# Patient Record
Sex: MALE | Race: BLACK OR AFRICAN AMERICAN | NOT HISPANIC OR LATINO | URBAN - METROPOLITAN AREA
[De-identification: names, ages, dates, MRNs, and addresses within clinical notes are randomized per-mention and may not be internally consistent; named-entity substitution may affect disease eponyms.]

---

## 2020-03-03 ENCOUNTER — APPOINTMENT (RX ONLY)
Dept: URBAN - METROPOLITAN AREA CLINIC 174 | Facility: CLINIC | Age: 20
Setting detail: DERMATOLOGY
End: 2020-03-03

## 2020-03-03 DIAGNOSIS — L70.0 ACNE VULGARIS: ICD-10-CM

## 2020-03-03 PROCEDURE — ? ORDER TESTS

## 2020-03-03 PROCEDURE — ? COUNSELING

## 2020-03-03 PROCEDURE — ? INTRALESIONAL KENALOG

## 2020-03-03 PROCEDURE — 11900 INJECT SKIN LESIONS </W 7: CPT

## 2020-03-03 ASSESSMENT — LOCATION DETAILED DESCRIPTION DERM
LOCATION DETAILED: LEFT CENTRAL MALAR CHEEK
LOCATION DETAILED: RIGHT LATERAL MALAR CHEEK
LOCATION DETAILED: LEFT NARIS

## 2020-03-03 ASSESSMENT — LOCATION SIMPLE DESCRIPTION DERM
LOCATION SIMPLE: LEFT CHEEK
LOCATION SIMPLE: LEFT NOSE
LOCATION SIMPLE: RIGHT CHEEK

## 2020-03-03 ASSESSMENT — LOCATION ZONE DERM
LOCATION ZONE: FACE
LOCATION ZONE: NOSE

## 2020-03-03 NOTE — PROCEDURE: COUNSELING
Patient Specific Counseling (Will Not Stick From Patient To Patient): \\n-continue isotretinoin authorized Doctor in [place]\\n-await culture 1.nares 2. cyst R cheek Patient Specific Counseling (Will Not Stick From Patient To Patient): \\n-continue isotretinoin authorized Doctor in [place]\\n-await culture 1.nares 2. cyst R cheek

## 2020-03-10 ENCOUNTER — RX ONLY (OUTPATIENT)
Age: 20
Setting detail: RX ONLY
End: 2020-03-10

## 2020-03-17 ENCOUNTER — APPOINTMENT (RX ONLY)
Dept: URBAN - METROPOLITAN AREA CLINIC 174 | Facility: CLINIC | Age: 20
Setting detail: DERMATOLOGY
End: 2020-03-17

## 2020-03-17 DIAGNOSIS — L72.8 OTHER FOLLICULAR CYSTS OF THE SKIN AND SUBCUTANEOUS TISSUE: ICD-10-CM

## 2020-03-17 DIAGNOSIS — L70.0 ACNE VULGARIS: ICD-10-CM

## 2020-03-17 PROCEDURE — ? ADDITIONAL NOTES

## 2020-03-17 PROCEDURE — ? COUNSELING

## 2020-03-17 PROCEDURE — ? INTRALESIONAL KENALOG

## 2020-03-17 PROCEDURE — 11900 INJECT SKIN LESIONS </W 7: CPT

## 2020-03-17 ASSESSMENT — LOCATION SIMPLE DESCRIPTION DERM: LOCATION SIMPLE: RIGHT CHEEK

## 2020-03-17 ASSESSMENT — LOCATION DETAILED DESCRIPTION DERM: LOCATION DETAILED: RIGHT CENTRAL MALAR CHEEK

## 2020-03-17 ASSESSMENT — LOCATION ZONE DERM: LOCATION ZONE: FACE

## 2020-03-17 NOTE — PROCEDURE: COUNSELING
Erythromycin Pregnancy And Lactation Text: This medication is Pregnancy Category B and is considered safe during pregnancy. It is also excreted in breast milk.
Bactrim Counseling:  I discussed with the patient the risks of sulfa antibiotics including but not limited to GI upset, allergic reaction, drug rash, diarrhea, dizziness, photosensitivity, and yeast infections.  Rarely, more serious reactions can occur including but not limited to aplastic anemia, agranulocytosis, methemoglobinemia, blood dyscrasias, liver or kidney failure, lung infiltrates or desquamative/blistering drug rashes.
Minocycline Counseling: Patient advised regarding possible photosensitivity and discoloration of the teeth, skin, lips, tongue and gums.  Patient instructed to avoid sunlight, if possible.  When exposed to sunlight, patients should wear protective clothing, sunglasses, and sunscreen.  The patient was instructed to call the office immediately if the following severe adverse effects occur:  hearing changes, easy bruising/bleeding, severe headache, or vision changes.  The patient verbalized understanding of the proper use and possible adverse effects of minocycline.  All of the patient's questions and concerns were addressed.
Benzoyl Peroxide Pregnancy And Lactation Text: This medication is Pregnancy Category C. It is unknown if benzoyl peroxide is excreted in breast milk.
Topical Clindamycin Counseling: Patient counseled that this medication may cause skin irritation or allergic reactions.  In the event of skin irritation, the patient was advised to reduce the amount of the drug applied or use it less frequently.   The patient verbalized understanding of the proper use and possible adverse effects of clindamycin.  All of the patient's questions and concerns were addressed.
Spironolactone Pregnancy And Lactation Text: This medication can cause feminization of the male fetus and should be avoided during pregnancy. The active metabolite is also found in breast milk.
Include Pregnancy/Lactation Warning?: No
Dapsone Pregnancy And Lactation Text: This medication is Pregnancy Category C and is not considered safe during pregnancy or breast feeding.
Isotretinoin Counseling: Patient should get monthly blood tests, not donate blood, not drive at night if vision affected, not share medication, and not undergo elective surgery for 6 months after tx completed. Side effects reviewed, pt to contact office should one occur.
Minocycline Pregnancy And Lactation Text: This medication is Pregnancy Category D and not consider safe during pregnancy. It is also excreted in breast milk.
Bactrim Pregnancy And Lactation Text: This medication is Pregnancy Category D and is known to cause fetal risk.  It is also excreted in breast milk.
Topical Retinoid counseling:  Patient advised to apply a pea-sized amount only at bedtime and wait 30 minutes after washing their face before applying.  If too drying, patient may add a non-comedogenic moisturizer. The patient verbalized understanding of the proper use and possible adverse effects of retinoids.  All of the patient's questions and concerns were addressed.
Topical Clindamycin Pregnancy And Lactation Text: This medication is Pregnancy Category B and is considered safe during pregnancy. It is unknown if it is excreted in breast milk.
Tetracycline Counseling: Patient counseled regarding possible photosensitivity and increased risk for sunburn.  Patient instructed to avoid sunlight, if possible.  When exposed to sunlight, patients should wear protective clothing, sunglasses, and sunscreen.  The patient was instructed to call the office immediately if the following severe adverse effects occur:  hearing changes, easy bruising/bleeding, severe headache, or vision changes.  The patient verbalized understanding of the proper use and possible adverse effects of tetracycline.  All of the patient's questions and concerns were addressed. Patient understands to avoid pregnancy while on therapy due to potential birth defects.
Doxycycline Counseling:  Patient counseled regarding possible photosensitivity and increased risk for sunburn.  Patient instructed to avoid sunlight, if possible.  When exposed to sunlight, patients should wear protective clothing, sunglasses, and sunscreen.  The patient was instructed to call the office immediately if the following severe adverse effects occur:  hearing changes, easy bruising/bleeding, severe headache, or vision changes.  The patient verbalized understanding of the proper use and possible adverse effects of doxycycline.  All of the patient's questions and concerns were addressed.
Isotretinoin Pregnancy And Lactation Text: This medication is Pregnancy Category X and is considered extremely dangerous during pregnancy. It is unknown if it is excreted in breast milk.
Sarecycline Counseling: Patient advised regarding possible photosensitivity and discoloration of the teeth, skin, lips, tongue and gums.  Patient instructed to avoid sunlight, if possible.  When exposed to sunlight, patients should wear protective clothing, sunglasses, and sunscreen.  The patient was instructed to call the office immediately if the following severe adverse effects occur:  hearing changes, easy bruising/bleeding, severe headache, or vision changes.  The patient verbalized understanding of the proper use and possible adverse effects of sarecycline.  All of the patient's questions and concerns were addressed.
Topical Retinoid Pregnancy And Lactation Text: This medication is Pregnancy Category C. It is unknown if this medication is excreted in breast milk.
Detail Level: Detailed
Topical Sulfur Applications Counseling: Topical Sulfur Counseling: Patient counseled that this medication may cause skin irritation or allergic reactions.  In the event of skin irritation, the patient was advised to reduce the amount of the drug applied or use it less frequently.   The patient verbalized understanding of the proper use and possible adverse effects of topical sulfur application.  All of the patient's questions and concerns were addressed.
Birth Control Pills Counseling: Birth Control Pill Counseling: I discussed with the patient the potential side effects of OCPs including but not limited to increased risk of stroke, heart attack, thrombophlebitis, deep venous thrombosis, hepatic adenomas, breast changes, GI upset, headaches, and depression.  The patient verbalized understanding of the proper use and possible adverse effects of OCPs. All of the patient's questions and concerns were addressed.
Doxycycline Pregnancy And Lactation Text: This medication is Pregnancy Category D and not consider safe during pregnancy. It is also excreted in breast milk but is considered safe for shorter treatment courses.
Azithromycin Counseling:  I discussed with the patient the risks of azithromycin including but not limited to GI upset, allergic reaction, drug rash, diarrhea, and yeast infections.
High Dose Vitamin A Counseling: Side effects reviewed, pt to contact office should one occur.
Tazorac Counseling:  Patient advised that medication is irritating and drying.  Patient may need to apply sparingly and wash off after an hour before eventually leaving it on overnight.  The patient verbalized understanding of the proper use and possible adverse effects of tazorac.  All of the patient's questions and concerns were addressed.
Birth Control Pills Pregnancy And Lactation Text: This medication should be avoided if pregnant and for the first 30 days post-partum.
Erythromycin Counseling:  I discussed with the patient the risks of erythromycin including but not limited to GI upset, allergic reaction, drug rash, diarrhea, increase in liver enzymes, and yeast infections.
Topical Sulfur Applications Pregnancy And Lactation Text: This medication is Pregnancy Category C and has an unknown safety profile during pregnancy. It is unknown if this topical medication is excreted in breast milk.
Benzoyl Peroxide Counseling: Patient counseled that medicine may cause skin irritation and bleach clothing.  In the event of skin irritation, the patient was advised to reduce the amount of the drug applied or use it less frequently.   The patient verbalized understanding of the proper use and possible adverse effects of benzoyl peroxide.  All of the patient's questions and concerns were addressed.
Azithromycin Pregnancy And Lactation Text: This medication is considered safe during pregnancy and is also secreted in breast milk.
High Dose Vitamin A Pregnancy And Lactation Text: High dose vitamin A therapy is contraindicated during pregnancy and breast feeding.
Tazorac Pregnancy And Lactation Text: This medication is not safe during pregnancy. It is unknown if this medication is excreted in breast milk.
Spironolactone Counseling: Patient advised regarding risks of diarrhea, abdominal pain, hyperkalemia, birth defects (for female patients), liver toxicity and renal toxicity. The patient may need blood work to monitor liver and kidney function and potassium levels while on therapy. The patient verbalized understanding of the proper use and possible adverse effects of spironolactone.  All of the patient's questions and concerns were addressed.
Dapsone Counseling: I discussed with the patient the risks of dapsone including but not limited to hemolytic anemia, agranulocytosis, rashes, methemoglobinemia, kidney failure, peripheral neuropathy, headaches, GI upset, and liver toxicity.  Patients who start dapsone require monitoring including baseline LFTs and weekly CBCs for the first month, then every month thereafter.  The patient verbalized understanding of the proper use and possible adverse effects of dapsone.  All of the patient's questions and concerns were addressed.

## 2020-04-06 ENCOUNTER — RX ONLY (OUTPATIENT)
Age: 20
Setting detail: RX ONLY
End: 2020-04-06

## 2020-08-24 ENCOUNTER — APPOINTMENT (RX ONLY)
Dept: URBAN - METROPOLITAN AREA CLINIC 174 | Facility: CLINIC | Age: 20
Setting detail: DERMATOLOGY
End: 2020-08-24

## 2020-08-24 VITALS — TEMPERATURE: 97.1 F

## 2020-08-24 DIAGNOSIS — L70.0 ACNE VULGARIS: ICD-10-CM

## 2020-08-24 PROCEDURE — ? COUNSELING

## 2020-08-24 PROCEDURE — ? PRESCRIPTION

## 2020-08-24 PROCEDURE — 99213 OFFICE O/P EST LOW 20 MIN: CPT

## 2020-08-24 PROCEDURE — ? ADDITIONAL NOTES

## 2020-08-24 RX ORDER — DESONIDE 0.5 MG/G
CREAM TOPICAL
Qty: 1 | Refills: 0 | Status: ERX | COMMUNITY
Start: 2020-08-24

## 2020-08-24 RX ORDER — TAZAROTENE 1 MG/G
CREAM TOPICAL
Qty: 1 | Refills: 1 | Status: ERX | COMMUNITY
Start: 2020-08-24

## 2020-08-24 RX ADMIN — TAZAROTENE: 1 CREAM TOPICAL at 00:00

## 2020-08-24 RX ADMIN — DESONIDE: 0.5 CREAM TOPICAL at 00:00

## 2023-10-03 ENCOUNTER — LAB (OUTPATIENT)
Dept: LAB | Facility: LAB | Age: 23
End: 2023-10-03
Payer: COMMERCIAL

## 2023-10-03 DIAGNOSIS — K50.90 CROHN'S DISEASE, UNSPECIFIED, WITHOUT COMPLICATIONS (MULTI): ICD-10-CM

## 2023-10-03 DIAGNOSIS — K50.90 CROHN'S DISEASE, UNSPECIFIED, WITHOUT COMPLICATIONS (MULTI): Primary | ICD-10-CM

## 2023-10-03 PROCEDURE — 86140 C-REACTIVE PROTEIN: CPT

## 2023-10-03 PROCEDURE — 83993 ASSAY FOR CALPROTECTIN FECAL: CPT

## 2023-10-03 PROCEDURE — 85652 RBC SED RATE AUTOMATED: CPT

## 2023-10-03 PROCEDURE — 80053 COMPREHEN METABOLIC PANEL: CPT

## 2023-10-03 PROCEDURE — 36415 COLL VENOUS BLD VENIPUNCTURE: CPT

## 2023-10-03 PROCEDURE — 80145 DRUG ASSAY ADALIMUMAB: CPT | Performed by: NURSE PRACTITIONER

## 2023-10-03 PROCEDURE — 85027 COMPLETE CBC AUTOMATED: CPT

## 2023-10-03 PROCEDURE — 86481 TB AG RESPONSE T-CELL SUSP: CPT

## 2023-10-04 LAB
ALBUMIN SERPL BCP-MCNC: 4.8 G/DL (ref 3.4–5)
ALP SERPL-CCNC: 52 U/L (ref 33–120)
ALT SERPL W P-5'-P-CCNC: 26 U/L (ref 10–52)
ANION GAP SERPL CALC-SCNC: 12 MMOL/L (ref 10–20)
AST SERPL W P-5'-P-CCNC: 22 U/L (ref 9–39)
BILIRUB SERPL-MCNC: 1 MG/DL (ref 0–1.2)
BUN SERPL-MCNC: 17 MG/DL (ref 6–23)
CALCIUM SERPL-MCNC: 10.1 MG/DL (ref 8.6–10.6)
CHLORIDE SERPL-SCNC: 104 MMOL/L (ref 98–107)
CO2 SERPL-SCNC: 28 MMOL/L (ref 21–32)
CREAT SERPL-MCNC: 1.15 MG/DL (ref 0.5–1.3)
CRP SERPL-MCNC: <0.1 MG/DL
ERYTHROCYTE [DISTWIDTH] IN BLOOD BY AUTOMATED COUNT: 12.4 % (ref 11.5–14.5)
ERYTHROCYTE [SEDIMENTATION RATE] IN BLOOD BY WESTERGREN METHOD: 1 MM/H (ref 0–15)
GFR SERPL CREATININE-BSD FRML MDRD: >90 ML/MIN/1.73M*2
GLUCOSE SERPL-MCNC: 83 MG/DL (ref 74–99)
HCT VFR BLD AUTO: 46.4 % (ref 41–52)
HGB BLD-MCNC: 15.7 G/DL (ref 13.5–17.5)
MCH RBC QN AUTO: 31.3 PG (ref 26–34)
MCHC RBC AUTO-ENTMCNC: 33.8 G/DL (ref 32–36)
MCV RBC AUTO: 93 FL (ref 80–100)
NRBC BLD-RTO: 0 /100 WBCS (ref 0–0)
PLATELET # BLD AUTO: 189 X10*3/UL (ref 150–450)
PMV BLD AUTO: 12.3 FL (ref 7.5–11.5)
POTASSIUM SERPL-SCNC: 4.2 MMOL/L (ref 3.5–5.3)
PROT SERPL-MCNC: 7.3 G/DL (ref 6.4–8.2)
RBC # BLD AUTO: 5.01 X10*6/UL (ref 4.5–5.9)
SODIUM SERPL-SCNC: 140 MMOL/L (ref 136–145)
WBC # BLD AUTO: 8.1 X10*3/UL (ref 4.4–11.3)

## 2023-10-06 LAB
NIL(NEG) CONTROL SPOT COUNT: NORMAL
PANEL A SPOT COUNT: 0
PANEL B SPOT COUNT: 1
POS CONTROL SPOT COUNT: NORMAL
T-SPOT. TB INTERPRETATION: NEGATIVE

## 2023-10-07 LAB
ADALIMUMAB NAB TITR SER BIOASSAY: NOT DETECTED {TITER}
ADALIMUMAB SERPL-MCNC: 16.34 UG/ML
CALPROTECTIN STL-MCNT: 122 UG/G
PATHOLOGY STUDY: NORMAL

## 2023-10-10 DIAGNOSIS — K50.80 CROHN'S DISEASE OF BOTH SMALL AND LARGE INTESTINE WITHOUT COMPLICATION (MULTI): Primary | ICD-10-CM

## 2024-02-06 DIAGNOSIS — K50.80 CROHN'S DISEASE OF BOTH SMALL AND LARGE INTESTINE WITHOUT COMPLICATION (MULTI): Primary | ICD-10-CM

## 2024-02-06 RX ORDER — ADALIMUMAB 40MG/0.4ML
1 KIT SUBCUTANEOUS
Qty: 4 EACH | Refills: 6 | Status: SHIPPED | OUTPATIENT
Start: 2024-02-06 | End: 2024-03-05 | Stop reason: SDUPTHER

## 2024-03-05 ENCOUNTER — TELEPHONE (OUTPATIENT)
Dept: GASTROENTEROLOGY | Facility: HOSPITAL | Age: 24
End: 2024-03-05
Payer: COMMERCIAL

## 2024-03-05 DIAGNOSIS — K50.80 CROHN'S DISEASE OF BOTH SMALL AND LARGE INTESTINE WITHOUT COMPLICATION (MULTI): ICD-10-CM

## 2024-03-05 RX ORDER — ADALIMUMAB 40MG/0.4ML
1 KIT SUBCUTANEOUS
Qty: 4 EACH | Refills: 6 | Status: SHIPPED | OUTPATIENT
Start: 2024-03-05 | End: 2024-03-13 | Stop reason: WASHOUT

## 2024-03-07 ENCOUNTER — SPECIALTY PHARMACY (OUTPATIENT)
Dept: PHARMACY | Facility: CLINIC | Age: 24
End: 2024-03-07

## 2024-03-13 DIAGNOSIS — K50.80 CROHN'S DISEASE OF BOTH SMALL AND LARGE INTESTINE WITHOUT COMPLICATION (MULTI): Primary | ICD-10-CM

## 2024-03-13 RX ORDER — ADALIMUMAB-ADAZ 40 MG/.4ML
40 INJECTION, SOLUTION SUBCUTANEOUS
Qty: 2.4 ML | Refills: 3 | Status: SHIPPED | OUTPATIENT
Start: 2024-03-13 | End: 2025-03-13

## 2024-06-25 ENCOUNTER — OFFICE VISIT (OUTPATIENT)
Dept: GASTROENTEROLOGY | Facility: HOSPITAL | Age: 24
End: 2024-06-25
Payer: COMMERCIAL

## 2024-06-25 ENCOUNTER — LAB (OUTPATIENT)
Dept: LAB | Facility: LAB | Age: 24
End: 2024-06-25
Payer: COMMERCIAL

## 2024-06-25 ENCOUNTER — APPOINTMENT (OUTPATIENT)
Dept: GASTROENTEROLOGY | Facility: HOSPITAL | Age: 24
End: 2024-06-25
Payer: COMMERCIAL

## 2024-06-25 VITALS
HEIGHT: 70 IN | TEMPERATURE: 98.3 F | BODY MASS INDEX: 25.62 KG/M2 | SYSTOLIC BLOOD PRESSURE: 102 MMHG | HEART RATE: 93 BPM | WEIGHT: 179 LBS | OXYGEN SATURATION: 97 % | DIASTOLIC BLOOD PRESSURE: 66 MMHG

## 2024-06-25 DIAGNOSIS — K50.80 CROHN'S DISEASE OF BOTH SMALL AND LARGE INTESTINE WITHOUT COMPLICATION (MULTI): ICD-10-CM

## 2024-06-25 DIAGNOSIS — K21.9 GASTROESOPHAGEAL REFLUX DISEASE WITHOUT ESOPHAGITIS: ICD-10-CM

## 2024-06-25 DIAGNOSIS — K50.80 CROHN'S DISEASE OF BOTH SMALL AND LARGE INTESTINE WITHOUT COMPLICATION (MULTI): Primary | ICD-10-CM

## 2024-06-25 LAB
ALBUMIN SERPL BCP-MCNC: 4.4 G/DL (ref 3.4–5)
ALP SERPL-CCNC: 48 U/L (ref 33–120)
ALT SERPL W P-5'-P-CCNC: 38 U/L (ref 10–52)
ANION GAP SERPL CALC-SCNC: 12 MMOL/L (ref 10–20)
AST SERPL W P-5'-P-CCNC: 21 U/L (ref 9–39)
BILIRUB SERPL-MCNC: 0.6 MG/DL (ref 0–1.2)
BUN SERPL-MCNC: 14 MG/DL (ref 6–23)
CALCIUM SERPL-MCNC: 9.5 MG/DL (ref 8.6–10.6)
CHLORIDE SERPL-SCNC: 105 MMOL/L (ref 98–107)
CO2 SERPL-SCNC: 26 MMOL/L (ref 21–32)
CREAT SERPL-MCNC: 1.1 MG/DL (ref 0.5–1.3)
CRP SERPL-MCNC: <0.1 MG/DL
EGFRCR SERPLBLD CKD-EPI 2021: >90 ML/MIN/1.73M*2
ERYTHROCYTE [DISTWIDTH] IN BLOOD BY AUTOMATED COUNT: 12.6 % (ref 11.5–14.5)
ERYTHROCYTE [SEDIMENTATION RATE] IN BLOOD BY WESTERGREN METHOD: 9 MM/H (ref 0–15)
GLUCOSE SERPL-MCNC: 79 MG/DL (ref 74–99)
HCT VFR BLD AUTO: 45.6 % (ref 41–52)
HGB BLD-MCNC: 15.8 G/DL (ref 13.5–17.5)
MCH RBC QN AUTO: 31.2 PG (ref 26–34)
MCHC RBC AUTO-ENTMCNC: 34.6 G/DL (ref 32–36)
MCV RBC AUTO: 90 FL (ref 80–100)
NRBC BLD-RTO: 0 /100 WBCS (ref 0–0)
PLATELET # BLD AUTO: 204 X10*3/UL (ref 150–450)
POTASSIUM SERPL-SCNC: 4.3 MMOL/L (ref 3.5–5.3)
PROT SERPL-MCNC: 6.9 G/DL (ref 6.4–8.2)
RBC # BLD AUTO: 5.06 X10*6/UL (ref 4.5–5.9)
SODIUM SERPL-SCNC: 139 MMOL/L (ref 136–145)
WBC # BLD AUTO: 6.8 X10*3/UL (ref 4.4–11.3)

## 2024-06-25 PROCEDURE — 86481 TB AG RESPONSE T-CELL SUSP: CPT

## 2024-06-25 PROCEDURE — 36415 COLL VENOUS BLD VENIPUNCTURE: CPT

## 2024-06-25 PROCEDURE — 85027 COMPLETE CBC AUTOMATED: CPT

## 2024-06-25 PROCEDURE — 83993 ASSAY FOR CALPROTECTIN FECAL: CPT

## 2024-06-25 PROCEDURE — 99214 OFFICE O/P EST MOD 30 MIN: CPT | Performed by: NURSE PRACTITIONER

## 2024-06-25 PROCEDURE — 86140 C-REACTIVE PROTEIN: CPT

## 2024-06-25 PROCEDURE — 85652 RBC SED RATE AUTOMATED: CPT

## 2024-06-25 PROCEDURE — 1036F TOBACCO NON-USER: CPT | Performed by: NURSE PRACTITIONER

## 2024-06-25 PROCEDURE — 80053 COMPREHEN METABOLIC PANEL: CPT

## 2024-06-25 RX ORDER — FAMOTIDINE 20 MG/1
1 TABLET, FILM COATED ORAL 2 TIMES DAILY
COMMUNITY
Start: 2022-09-07 | End: 2024-06-25 | Stop reason: SDUPTHER

## 2024-06-25 RX ORDER — FAMOTIDINE 20 MG/1
20 TABLET, FILM COATED ORAL 2 TIMES DAILY
Qty: 180 TABLET | Refills: 3 | Status: SHIPPED | OUTPATIENT
Start: 2024-06-25 | End: 2025-06-25

## 2024-06-25 RX ORDER — PROPRANOLOL/HYDROCHLOROTHIAZID 40 MG-25MG
TABLET ORAL
COMMUNITY

## 2024-06-25 RX ORDER — FLUTICASONE PROPIONATE 50 MCG
1 SPRAY, SUSPENSION (ML) NASAL
COMMUNITY
Start: 2024-05-28

## 2024-06-25 RX ORDER — ASPIRIN 325 MG
TABLET ORAL
COMMUNITY

## 2024-06-25 ASSESSMENT — PAIN SCALES - GENERAL: PAINLEVEL: 0-NO PAIN

## 2024-06-25 NOTE — PROGRESS NOTES
Subjective   Patient ID: Cam Allen is a 24 y.o. male.    HPI  24-year-old male for follow-up of Crohn's disease  Previously seen 9/22/2023 abs reviewed 10/3/2023  Fecal calprotectin 122  Adalimumab level 16.34 no antibodies detected  Normal CMP  , CRP, sed rate, H&H negative TB spot  On Humira every other week- was changed to Hyrimoz and doing well  Bm good  No blood  Has had some sinus symptoms  Was on antibiotics and steroids for a while  Takes zyrtec for allergies    IBD HX:  He originally presented in 2009 with symptoms and was diagnosed in October 2009 with an upper scope that showed a few small ulcers in the esophagus and stomach and a colonoscopy that showed ulcers in the terminal ileum. Biopsies confirmed chronic changes and a diagnosis of Crohn's was confirmed. He was started on oral steroids, Imuran and Pentasa at that time. He was off initial steroids by 12/2009. In mid 2010 he had some abdominal pain and another course of steroids while continuing on his Imuran but holding his Pentasa. He improved and was weaned off steroids. Pentasa was restarted again in 2011 and he was in clinical remission in 2012 through early 2013. At that time he had a repeat scope with ulcers in his stomach and terminal ileum his Imuran was increased to 100 mg once daily and he was treated with Entocort. His Imuran was subsequently increased to 125 mg daily which was still below therapeutic levels. He then was transitioned to a different provider and continued to have inflammation on his scopes. Entocort was discontinued. He remained in remission from 7 . He went for repeat scopes 6/9/2021 for reassessment of his Crohn's disease which showed normal upper but an area of inflammation in the terminal ileum. He was subsequently started on Humira and discontinued his Imuran and Pentasa.    Objective   Physical Exam  Cardiovascular:      Rate and Rhythm: Normal rate and regular rhythm.      Pulses: Normal pulses.       Heart sounds: Normal heart sounds.   Pulmonary:      Effort: Pulmonary effort is normal.      Breath sounds: Normal breath sounds.   Abdominal:      General: Bowel sounds are normal.      Palpations: Abdomen is soft.         Assessment/Plan     Crohns disease- I would recommend continuing the Adalimumab every other to control your disease. You are also due for blood work stool for fecal calprotectin and a colonoscopy.    I will see you back for follow up in 6 months or sooner if needed

## 2024-06-25 NOTE — PATIENT INSTRUCTIONS
Crohns disease- I would recommend continuing the Adalimumab every other to control your disease. You are also due for blood work stool for fecal calprotectin and a colonoscopy.    I will see you back for follow up in 6 months or sooner if needed

## 2024-06-27 LAB
NIL(NEG) CONTROL SPOT COUNT: NORMAL
PANEL A SPOT COUNT: 0
PANEL B SPOT COUNT: 0
POS CONTROL SPOT COUNT: NORMAL
T-SPOT. TB INTERPRETATION: NEGATIVE

## 2024-06-28 LAB — CALPROTECTIN STL-MCNT: 37 UG/G

## 2024-09-20 ENCOUNTER — APPOINTMENT (OUTPATIENT)
Dept: ORTHOPEDIC SURGERY | Facility: CLINIC | Age: 24
End: 2024-09-20
Payer: COMMERCIAL

## 2024-09-20 ENCOUNTER — HOSPITAL ENCOUNTER (OUTPATIENT)
Dept: RADIOLOGY | Facility: CLINIC | Age: 24
Discharge: HOME | End: 2024-09-20
Payer: COMMERCIAL

## 2024-09-20 ENCOUNTER — OFFICE VISIT (OUTPATIENT)
Dept: ORTHOPEDIC SURGERY | Facility: CLINIC | Age: 24
End: 2024-09-20
Payer: COMMERCIAL

## 2024-09-20 VITALS
WEIGHT: 177.03 LBS | DIASTOLIC BLOOD PRESSURE: 55 MMHG | HEART RATE: 60 BPM | BODY MASS INDEX: 25.34 KG/M2 | SYSTOLIC BLOOD PRESSURE: 113 MMHG | HEIGHT: 70 IN

## 2024-09-20 DIAGNOSIS — M25.562 ACUTE PAIN OF LEFT KNEE: ICD-10-CM

## 2024-09-20 DIAGNOSIS — M25.562 ACUTE PAIN OF LEFT KNEE: Primary | ICD-10-CM

## 2024-09-20 PROCEDURE — 99214 OFFICE O/P EST MOD 30 MIN: CPT | Performed by: PEDIATRICS

## 2024-09-20 PROCEDURE — 1036F TOBACCO NON-USER: CPT | Performed by: PEDIATRICS

## 2024-09-20 PROCEDURE — 97530 THERAPEUTIC ACTIVITIES: CPT | Performed by: PEDIATRICS

## 2024-09-20 PROCEDURE — 99204 OFFICE O/P NEW MOD 45 MIN: CPT | Performed by: PEDIATRICS

## 2024-09-20 PROCEDURE — 3008F BODY MASS INDEX DOCD: CPT | Performed by: PEDIATRICS

## 2024-09-20 PROCEDURE — 73564 X-RAY EXAM KNEE 4 OR MORE: CPT | Mod: LT

## 2024-09-20 ASSESSMENT — PAIN SCALES - GENERAL: PAINLEVEL: 0-NO PAIN

## 2024-09-20 NOTE — LETTER
September 23, 2024     Mahi Newsome MD  [address]  [facility] [address]    Patient: Cam Allen   YOB: 2000   Date of Visit: 9/20/2024       Dear Dr. Mahi Newsome MD:    Thank you for referring Cam Allen to me for evaluation. Below are my notes for this consultation.  If you have questions, please do not hesitate to call me. I look forward to following your patient along with you.       Sincerely,     Stephanie COSBY Gerard,       CC: No Recipients  ______________________________________________________________________________________    Access Code: [email or web address]  URL: [email or web address]  Date: 09/20/2024  Prepared by: Edward Spangler AT, PTA    Exercises  - Standing Hamstring Stretch with Step  - 2-3 x daily - 7 x weekly - 3 sets - 30 hold  - Half Kneeling Hip Flexor Stretch  - 2-3 x daily - 7 x weekly - 3 sets - 30 hold  - Sidelying Hip Abduction  - 1 x daily - 3-5 x weekly - 1-3 sets - 10 reps - 5 hold  - Forward Monster Walks  - 1 x daily - 3-5 x weekly - 3 sets - 10 reps  - Backward Monster Walks  - 1 x daily - 3-5 x weekly - 3 sets - 10 reps  - Side Stepping with Resistance at Thighs  - 1 x daily - 3-5 x weekly - 3 sets - 10 reps  - Single Leg Stance  - 1 x daily - 7 x weekly - 3 sets - 30 hold  - Single Leg Balance with Clock Reach  - 1 x daily - 7 x weekly - 3 sets - 10-20 reps  - Diagonal Forward Reaches  - 1 x daily - 7 x weekly - 3 sets - 10-20 reps    Chief Complaint   Patient presents with   • Left Knee - Follow-up       Consulting physician: Mahi Newsome MD    A report with my findings and recommendations will be sent to the primary and referring physician via written or electronic means when information is available    History of Present Illness:  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 He did not have pain while playing, but afterwards had progressively worsening pain. When he woke up in the morning he was having significant pain on the medial aspect of his knee particular with flexion and extension and ambulation.  Going up and down stairs is particularly painful as well.  He did not ice or try any medicine, but notes yesterday stopped limping and overall feels improved.  Denies any significant swelling, bruising.  Denies any ankle or hip pain.  Denies any numbness tingling or weakness.    Past [facility] HX:  Specialty Problems    None       ROS  12 point ROS reviewed and is negative except for items listed     Medications:   Current Outpatient Medications on File Prior to Visit   Medication Sig Dispense Refill   • adalimumab-adaz (Hyrimoz,CF, Pen) 40 mg/0.4 mL pen injector Inject 40 mg under the skin every 14 (fourteen) days. 2.4 mL 3   • famotidine (Pepcid) 20 mg tablet Take 1 tablet (20 mg) by mouth 2 times a day. 180 tablet 3   • fluticasone (Flonase) 50 mcg/actuation nasal spray 1 spray by Does not apply route once daily.     • Lactobacillus rhamnosus GG (CULTURELLE) 15 billion cell capsule, sprinkle capsule Take 1 capsule by mouth once daily.     • multivitamin (One Daily Multivitamin) tablet Take by mouth.     • turmeric-turmeric root extract 450-50 mg capsule Take by mouth.       No current facility-administered medications on file prior to visit.         Allergies:    Allergies   Allergen Reactions   • Codeine Hives        Physical Exam:    Visit Vitals  /55   Pulse 60   Ht 1.77 m (5' 9.69\")   Wt 80.3 kg (177 lb 0.5 oz)   BMI 25.63 kg/m²   Smoking Status Never   BSA 1.99 m²        Vitals reviewed    General appearance: Well-appearing well-nourished  Psych: Normal mood and affect    Neuro: Normal sensation to light touch throughout the involved extremities  Vascular: No extremity edema or discoloration.  Skin: negative.  Lymphatic: no regional lymphadenopathy " present.  Eyes: no conjunctival injection.      BILATERAL  Knee exam:     Inspection:  Effusion: None   Erythema No  Warmth No  Ecchymosis No  Quadriceps atrophy No    Knee ROM:    Flexion (140): Full, pain free  Extension (0): Full, pain free    Hip ROM:   Hip flexion (supine) (140) Full, pain free  Hip extension (prone) (15) Full, pain free  Hip abduction (45) Full, pain free  Hip adduction (30-45)Full, pain free  Hip IR at 90 flexion (40) Full, pain free  Hip ER at 90 Flexion(40-50) Full, pain free    Palpation:    TTP Medial joint line No  TTP Lateral joint line No  TTP MCL No  TTP LCL No    TTP Inferior medial patellar facets L   TTP Superior medial patellar facets L   TTP Inferior lateral patellar facets No  TTP Superior lateral patellar facet No    TTP Medial femoral condyle L  TTP Lateral femoral condyle No  TTP Medial tibial plateau No  TTP Lateral tibial plateau No  TTP Tibial tubercle No  TTP Inferior pole patella No  TTP Fibular head No  TTP Hoffa's fat pad No    TTP Distal hamstring tendon No  TTP Pes anserine bursa No  TTP Quad tendon No  TTP Patellar tendon No  TTP Proximal gastrocnemius tendon No  TTP Distal iliotibial band, Gerdy's tubercle No    TTP Hip joint line No    Patellar testing:   quadrants of glide: normal  Pain w/ patellar compression No  Apprehension Negative, but does have discomfort in the area of his medial L knee similar to his previous pain   Inhibition Negative    Ligament testing:   Lachman Negative   Anterior drawer Negative   Valgus stress testing performed at 0 and 20 Negative  Varus stress testing performed at 0 and 20 Negative   Posterior drawer Negative      Meniscus tests:   Franci's Negative   Apley's Grind Negative     Strength:  Quadriceps pain free, 5/5  Hamstring pain free, 5/5  Hip abduction pain free, 5/5  Hip adduction pain free, 5/5  Hip flexion seated pain free, 5/5  Hip flexion supine pain free, 5/5  Hip extension pain free, 5/5    Flexibility:   Popliteal  angle L 145  Popliteal angle R 150  Heel to butt: 4cm, 4cm     Functional:  Trendelenburg: Negative   Single leg squats: valgus, pain medial left  Hop test: non painful  Squat: no pain    Gait non-antalgic      Imaging:  No acute fracture or dislocation, medial and lateral joint lines have appropriate spacing above them.  No obvious effusion noted.    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  Exam and history consistent with traumatic patellofemoral pain.  Recommending strengthening exercises, stretching of his hip flexors and hamstrings, and anti-inflammatories.  ATC taught exercises in the office today.    A detailed exercise program (< 15 minutes of education time) was demonstrated and taught to the patient by Edward Spangler ATC / GAYLA. The purpose of the program was to restore functional strength, and/or range of motion, and/or flexibility. A handout with the exercises and instructions for online access to video demonstrations was provided.     Follow-up: As needed for his additional right hip and right shoulder pain.    Yasmany Palomo DO  [facility] Fellow     I saw and evaluated the patient. I personally obtained the key and critical portions of the history and physical exam or was physically present for key and critical portions performed by the resident/fellow. I reviewed the resident/fellow's documentation and discussed the patient with the resident/fellow. I agree with the resident/fellow's medical decision making as  documented in the note.    ** Please excuse any errors in grammar or translation related to this dictation. Voice recognition software was utilized to prepare this document. **

## 2024-09-20 NOTE — PROGRESS NOTES
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 He did not have pain while playing, but afterwards had progressively worsening pain. When he woke up in the morning he was having significant pain on the medial aspect of his knee particular with flexion and extension and ambulation.  Going up and down stairs is particularly painful as well.  He did not ice or try any medicine, but notes yesterday stopped limping and overall feels improved.  Denies any significant swelling, bruising.  Denies any ankle or hip pain.  Denies any numbness tingling or weakness.    Past [facility] HX:  Specialty Problems    None       ROS  12 point ROS reviewed and is negative except for items listed     Medications:   Current Outpatient Medications on File Prior to Visit   Medication Sig Dispense Refill    adalimumab-adaz (Hyrimoz,CF, Pen) 40 mg/0.4 mL pen injector Inject 40 mg under the skin every 14 (fourteen) days. 2.4 mL 3    famotidine (Pepcid) 20 mg tablet Take 1 tablet (20 mg) by mouth 2 times a day. 180 tablet 3    fluticasone (Flonase) 50 mcg/actuation nasal spray 1 spray by Does not apply route once daily.      Lactobacillus rhamnosus GG (CULTURELLE) 15 billion cell capsule, sprinkle capsule Take 1 capsule by mouth once daily.      multivitamin (One Daily Multivitamin) tablet Take by mouth.      turmeric-turmeric root extract 450-50 mg capsule Take by mouth.       No current facility-administered medications on file prior to visit.         Allergies:   "  Allergies   Allergen Reactions    Codeine Hives        Physical Exam:    Visit Vitals  /55   Pulse 60   Ht 1.77 m (5' 9.69\")   Wt 80.3 kg (177 lb 0.5 oz)   BMI 25.63 kg/m²   Smoking Status Never   BSA 1.99 m²        Vitals reviewed    General appearance: Well-appearing well-nourished  Psych: Normal mood and affect    Neuro: Normal sensation to light touch throughout the involved extremities  Vascular: No extremity edema or discoloration.  Skin: negative.  Lymphatic: no regional lymphadenopathy present.  Eyes: no conjunctival injection.      BILATERAL  Knee exam:     Inspection:  Effusion: None   Erythema No  Warmth No  Ecchymosis No  Quadriceps atrophy No    Knee ROM:    Flexion (140): Full, pain free  Extension (0): Full, pain free    Hip ROM:   Hip flexion (supine) (140) Full, pain free  Hip extension (prone) (15) Full, pain free  Hip abduction (45) Full, pain free  Hip adduction (30-45)Full, pain free  Hip IR at 90 flexion (40) Full, pain free  Hip ER at 90 Flexion(40-50) Full, pain free    Palpation:    TTP Medial joint line No  TTP Lateral joint line No  TTP MCL No  TTP LCL No    TTP Inferior medial patellar facets L   TTP Superior medial patellar facets L   TTP Inferior lateral patellar facets No  TTP Superior lateral patellar facet No    TTP Medial femoral condyle L  TTP Lateral femoral condyle No  TTP Medial tibial plateau No  TTP Lateral tibial plateau No  TTP Tibial tubercle No  TTP Inferior pole patella No  TTP Fibular head No  TTP Hoffa's fat pad No    TTP Distal hamstring tendon No  TTP Pes anserine bursa No  TTP Quad tendon No  TTP Patellar tendon No  TTP Proximal gastrocnemius tendon No  TTP Distal iliotibial band, Gerdy's tubercle No    TTP Hip joint line No    Patellar testing:   quadrants of glide: normal  Pain w/ patellar compression No  Apprehension Negative, but does have discomfort in the area of his medial L knee similar to his previous pain   Inhibition Negative    Ligament testing: "   Lachman Negative   Anterior drawer Negative   Valgus stress testing performed at 0 and 20 Negative  Varus stress testing performed at 0 and 20 Negative   Posterior drawer Negative      Meniscus tests:   Franci's Negative   Apley's Grind Negative     Strength:  Quadriceps pain free, 5/5  Hamstring pain free, 5/5  Hip abduction pain free, 5/5  Hip adduction pain free, 5/5  Hip flexion seated pain free, 5/5  Hip flexion supine pain free, 5/5  Hip extension pain free, 5/5    Flexibility:   Popliteal angle L 145  Popliteal angle R 150  Heel to butt: 4cm, 4cm     Functional:  Trendelenburg: Negative   Single leg squats: valgus, pain medial left  Hop test: non painful  Squat: no pain    Gait non-antalgic      Imaging:  No acute fracture or dislocation, medial and lateral joint lines have appropriate spacing above them.  No obvious effusion noted.    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  Exam and history consistent with traumatic patellofemoral pain.  Recommending strengthening exercises, stretching of his hip flexors and hamstrings, and anti-inflammatories.  ATC taught exercises in the office today.    A detailed exercise program (< 15 minutes of education time) was demonstrated and taught to the patient by Edward Spangler ATC / GAYLA. The purpose of the program was to restore functional strength, and/or range of motion, and/or flexibility. A handout with the exercises and instructions for online access to video demonstrations was  provided.     Follow-up: As needed for his additional right hip and right shoulder pain.    Yasmany Palomo, DO  [facility] Sports Medicine Fellow     I saw and evaluated the patient. I personally obtained the key and critical portions of the history and physical exam or was physically present for key and critical portions performed by the resident/fellow. I reviewed the resident/fellow's documentation and discussed the patient with the resident/fellow. I agree with the resident/fellow's medical decision making as documented in the note.    ** Please excuse any errors in grammar or translation related to this dictation. Voice recognition software was utilized to prepare this document. **

## 2024-09-20 NOTE — PROGRESS NOTES
Access Code: [email or web address]  URL: [email or web address]  Date: 09/20/2024  Prepared by: Edward Spangler AT, PTA    Exercises  - Standing Hamstring Stretch with Step  - 2-3 x daily - 7 x weekly - 3 sets - 30 hold  - Half Kneeling Hip Flexor Stretch  - 2-3 x daily - 7 x weekly - 3 sets - 30 hold  - Sidelying Hip Abduction  - 1 x daily - 3-5 x weekly - 1-3 sets - 10 reps - 5 hold  - Forward Monster Walks  - 1 x daily - 3-5 x weekly - 3 sets - 10 reps  - Backward Monster Walks  - 1 x daily - 3-5 x weekly - 3 sets - 10 reps  - Side Stepping with Resistance at Thighs  - 1 x daily - 3-5 x weekly - 3 sets - 10 reps  - Single Leg Stance  - 1 x daily - 7 x weekly - 3 sets - 30 hold  - Single Leg Balance with Clock Reach  - 1 x daily - 7 x weekly - 3 sets - 10-20 reps  - Diagonal Forward Reaches  - 1 x daily - 7 x weekly - 3 sets - 10-20 reps

## 2024-09-26 NOTE — HPI: PIMPLES (ACNE)
How Severe Is Your Acne?: mild
Is This A New Presentation, Or A Follow-Up?: Follow Up Acne
Additional Comments (Use Complete Sentences): Isotherm stopped May— mild breakout. Using tax and occ desonide— biggest c/o is redness , some flushing
25F denies PMH p/w several complaints. Has b/l back pain x1-2 days, intermittent. Also intermittent RLQ pain. Also subjective fever since yesterday. Also has chronic intermittent dysuria. States that ~2w ago she developed dysuria, went to  and was started on abx for UTI - however the urine cx came back negative so she stopped the abx. She then developed vaginal itching, was prescribed fluconazole after a tele doc visit. The next day she developed rash/pruritis and returned to  and was diagnosed w/ likely allergic rexn (and subsequently also had ED visit at [facility] ~1w ago fro rash). The rash and vaginal itching has completely resolved. Dysuria returned a few days ago. No other systemic symptoms.   Denies HA, URI symptoms, NVD, focal weakness/numbness, LE pain/swelling, SOB/CP.  On OCPs, last sexually active ~2w ago, does not use protection, 1 partner. No hx STDs. Denies vaginal discharge.

## 2024-10-24 RX ORDER — ONDANSETRON HYDROCHLORIDE 2 MG/ML
4 INJECTION, SOLUTION INTRAVENOUS ONCE AS NEEDED
Status: CANCELLED | OUTPATIENT
Start: 2024-10-24

## 2024-10-25 ENCOUNTER — HOSPITAL ENCOUNTER (OUTPATIENT)
Dept: GASTROENTEROLOGY | Facility: HOSPITAL | Age: 24
Discharge: HOME | End: 2024-10-25
Payer: COMMERCIAL

## 2024-10-25 VITALS
HEIGHT: 70 IN | TEMPERATURE: 97.7 F | DIASTOLIC BLOOD PRESSURE: 59 MMHG | WEIGHT: 176.37 LBS | HEART RATE: 89 BPM | SYSTOLIC BLOOD PRESSURE: 109 MMHG | OXYGEN SATURATION: 96 % | BODY MASS INDEX: 25.25 KG/M2 | RESPIRATION RATE: 18 BRPM

## 2024-10-25 DIAGNOSIS — K50.80 CROHN'S DISEASE OF BOTH SMALL AND LARGE INTESTINE WITHOUT COMPLICATION (MULTI): ICD-10-CM

## 2024-10-25 PROCEDURE — 3700000012 HC SEDATION LEVEL 5+ TIME - INITIAL 15 MINUTES 5/> YEARS

## 2024-10-25 PROCEDURE — 2500000004 HC RX 250 GENERAL PHARMACY W/ HCPCS (ALT 636 FOR OP/ED): Performed by: INTERNAL MEDICINE

## 2024-10-25 PROCEDURE — 7100000010 HC PHASE TWO TIME - EACH INCREMENTAL 1 MINUTE

## 2024-10-25 PROCEDURE — 3700000013 HC SEDATION LEVEL 5+ TIME - EACH ADDITIONAL 15 MINUTES

## 2024-10-25 PROCEDURE — 7100000009 HC PHASE TWO TIME - INITIAL BASE CHARGE

## 2024-10-25 PROCEDURE — 45380 COLONOSCOPY AND BIOPSY: CPT | Performed by: INTERNAL MEDICINE

## 2024-10-25 RX ORDER — MINERAL OIL
180 ENEMA (ML) RECTAL DAILY
COMMUNITY

## 2024-10-25 RX ORDER — MIDAZOLAM HYDROCHLORIDE 1 MG/ML
INJECTION INTRAMUSCULAR; INTRAVENOUS AS NEEDED
Status: COMPLETED | OUTPATIENT
Start: 2024-10-25 | End: 2024-10-25

## 2024-10-25 ASSESSMENT — COLUMBIA-SUICIDE SEVERITY RATING SCALE - C-SSRS
1. IN THE PAST MONTH, HAVE YOU WISHED YOU WERE DEAD OR WISHED YOU COULD GO TO SLEEP AND NOT WAKE UP?: NO
6. HAVE YOU EVER DONE ANYTHING, STARTED TO DO ANYTHING, OR PREPARED TO DO ANYTHING TO END YOUR LIFE?: NO
2. HAVE YOU ACTUALLY HAD ANY THOUGHTS OF KILLING YOURSELF?: NO

## 2024-10-25 ASSESSMENT — PAIN SCALES - GENERAL

## 2024-10-25 NOTE — POST-PROCEDURE NOTE
0929: Patient arrived to [facility] after procedure with Anesthesia and procedure RN, procedure discussed, plan reviewed, VSS  0932: pt tolerating PO intake at this time  0943: family bedside  0944: dr osorio bedside  0947: Discharge instructions discussed with patient and family at this time verbalized understanding   0959: pt taken off of monitor; pt dressed with assistance  1006 Family sent for vehicle, pt put in for transportation  1008: IV removed, pt tolerated well. Catheter intact; Transportation bedside  1009: Patient Discharged in stable condition via wheelchair to [facility]

## 2024-10-25 NOTE — H&P
History Of Present Illness  Cam Allen is a 24 y.o. male presenting with Crohn's disease on Humira therapy for disease evaluation.     Past Medical History  Past Medical History:   Diagnosis Date    Cramp and spasm 08/11/2014    Leg cramps    Crohn's disease (Multi)     Hordeolum externum unspecified eye, unspecified eyelid 04/26/2015    Stye    Juvenile osteochondrosis, unspecified     Apophysitis, juvenile    Osteochondropathy, unspecified of unspecified site 05/28/2015    Apophysitis    Other conditions influencing health status 12/06/2013    Closed Intra-articular Fracture Of The Left Calcaneus    Other specified fracture of unspecified ischium, initial encounter for closed fracture (Multi) 01/02/2015    Fracture of ischial tuberosity    Pain in leg, unspecified 01/02/2015    Leg pain    Pain in unspecified ankle and joints of unspecified foot     Ankle joint pain    Pain in unspecified elbow 05/28/2015    Elbow pain    Pain in unspecified knee     Joint pain, knee    Patellar tendinitis, unspecified knee 06/25/2014    Patellar tendinitis    Unspecified injury of lower back, initial encounter     Tailbone injury    Vitamin D deficiency, unspecified 03/24/2021    Vitamin D insufficiency     Surgical History  Past Surgical History:   Procedure Laterality Date    HERNIA REPAIR  2021    inguinal    OTHER SURGICAL HISTORY  10/15/2013    Excision Of Lesion Face Benign    OTHER SURGICAL HISTORY  06/09/2014    Excision Of Lesion Face     Social History  He reports that he has never smoked. He has never used smokeless tobacco. He reports current alcohol use of about 1.0 standard drink of alcohol per week. He reports that he does not use drugs.    Family History  No family history on file.     Allergies  Allergies   Allergen Reactions    Codeine Hives     Review of Systems  Pre-sedation Evaluation:  ASA Classification - ASA 2 - Patient with mild systemic disease with no functional limitations  Mallampati Score -  "III (soft and hard palate and base of uvula visible)    Physical Exam   Vital signs reviewed  Patient alert and oriented in no acute distress  Cardiac exam regular rate and rhythm S1-S2 without murmurs gallops or rubs  Lungs clear to auscultation bilaterally  Abdomen soft and nontender     Last Recorded Vitals  /59   Pulse 89   Temp 36.8 °C (98.2 °F) (Temporal)   Resp 12   Ht 1.778 m (5' 10\")   Wt 80 kg (176 lb 5.9 oz)   SpO2 96%   BMI 25.31 kg/m²      Assessment/Plan   Colonoscopy to evaluate Crohn's disease     PTA/Current Medications:  (Not in a hospital admission)    Current Outpatient Medications   Medication Sig Dispense Refill    adalimumab-adaz (Hyrimoz,CF, Pen) 40 mg/0.4 mL pen injector Inject 40 mg under the skin every 14 (fourteen) days. 2.4 mL 3    fexofenadine (Allegra) 180 mg tablet Take 1 tablet (180 mg) by mouth once daily.      Lactobacillus rhamnosus GG (CULTURELLE) 15 billion cell capsule, sprinkle capsule Take 1 capsule by mouth once daily.      multivitamin (One Daily Multivitamin) tablet Take by mouth.      turmeric-turmeric root extract 450-50 mg capsule Take by mouth.      famotidine (Pepcid) 20 mg tablet Take 1 tablet (20 mg) by mouth 2 times a day. (Patient not taking: No sig reported) 180 tablet 3    fluticasone (Flonase) 50 mcg/actuation nasal spray 1 spray by Does not apply route once daily as needed. (Patient not taking: Reported on 10/25/2024)       No current facility-administered medications for this encounter.     Ruddy Jarrett MD  "

## 2024-10-26 ENCOUNTER — HOSPITAL ENCOUNTER (EMERGENCY)
Facility: HOSPITAL | Age: 24
Discharge: HOME | End: 2024-10-26
Attending: EMERGENCY MEDICINE
Payer: COMMERCIAL

## 2024-10-26 ENCOUNTER — TELEPHONE (OUTPATIENT)
Dept: GASTROENTEROLOGY | Facility: HOSPITAL | Age: 24
End: 2024-10-26
Payer: COMMERCIAL

## 2024-10-26 VITALS
RESPIRATION RATE: 18 BRPM | WEIGHT: 175 LBS | HEART RATE: 98 BPM | HEIGHT: 70 IN | TEMPERATURE: 99.9 F | BODY MASS INDEX: 25.05 KG/M2 | DIASTOLIC BLOOD PRESSURE: 74 MMHG | OXYGEN SATURATION: 100 % | SYSTOLIC BLOOD PRESSURE: 137 MMHG

## 2024-10-26 DIAGNOSIS — K62.5 RECTAL BLEEDING: Primary | ICD-10-CM

## 2024-10-26 LAB
ALBUMIN SERPL BCP-MCNC: 4.4 G/DL (ref 3.4–5)
ALP SERPL-CCNC: 45 U/L (ref 33–120)
ALT SERPL W P-5'-P-CCNC: 22 U/L (ref 10–52)
ANION GAP SERPL CALC-SCNC: 15 MMOL/L (ref 10–20)
APPEARANCE UR: CLEAR
AST SERPL W P-5'-P-CCNC: 18 U/L (ref 9–39)
BASOPHILS # BLD AUTO: 0.02 X10*3/UL (ref 0–0.1)
BASOPHILS NFR BLD AUTO: 0.2 %
BILIRUB DIRECT SERPL-MCNC: 0.1 MG/DL (ref 0–0.3)
BILIRUB SERPL-MCNC: 0.7 MG/DL (ref 0–1.2)
BILIRUB UR STRIP.AUTO-MCNC: NEGATIVE MG/DL
BUN SERPL-MCNC: 12 MG/DL (ref 6–23)
CALCIUM SERPL-MCNC: 9.2 MG/DL (ref 8.6–10.3)
CHLORIDE SERPL-SCNC: 104 MMOL/L (ref 98–107)
CO2 SERPL-SCNC: 21 MMOL/L (ref 21–32)
COLOR UR: NORMAL
CREAT SERPL-MCNC: 1.18 MG/DL (ref 0.5–1.3)
CRP SERPL-MCNC: 5.05 MG/DL
EGFRCR SERPLBLD CKD-EPI 2021: 88 ML/MIN/1.73M*2
EOSINOPHIL # BLD AUTO: 0 X10*3/UL (ref 0–0.7)
EOSINOPHIL NFR BLD AUTO: 0 %
ERYTHROCYTE [DISTWIDTH] IN BLOOD BY AUTOMATED COUNT: 12.2 % (ref 11.5–14.5)
GLUCOSE SERPL-MCNC: 66 MG/DL (ref 74–99)
GLUCOSE UR STRIP.AUTO-MCNC: NORMAL MG/DL
HCT VFR BLD AUTO: 45 % (ref 41–52)
HGB BLD-MCNC: 15.5 G/DL (ref 13.5–17.5)
IMM GRANULOCYTES # BLD AUTO: 0.03 X10*3/UL (ref 0–0.7)
IMM GRANULOCYTES NFR BLD AUTO: 0.3 % (ref 0–0.9)
KETONES UR STRIP.AUTO-MCNC: NEGATIVE MG/DL
LEUKOCYTE ESTERASE UR QL STRIP.AUTO: NEGATIVE
LYMPHOCYTES # BLD AUTO: 0.95 X10*3/UL (ref 1.2–4.8)
LYMPHOCYTES NFR BLD AUTO: 10.4 %
MCH RBC QN AUTO: 30.6 PG (ref 26–34)
MCHC RBC AUTO-ENTMCNC: 34.4 G/DL (ref 32–36)
MCV RBC AUTO: 89 FL (ref 80–100)
MONOCYTES # BLD AUTO: 0.94 X10*3/UL (ref 0.1–1)
MONOCYTES NFR BLD AUTO: 10.3 %
MUCOUS THREADS #/AREA URNS AUTO: NORMAL /LPF
NEUTROPHILS # BLD AUTO: 7.16 X10*3/UL (ref 1.2–7.7)
NEUTROPHILS NFR BLD AUTO: 78.8 %
NITRITE UR QL STRIP.AUTO: NEGATIVE
NRBC BLD-RTO: 0 /100 WBCS (ref 0–0)
PH UR STRIP.AUTO: 6.5 [PH]
PLATELET # BLD AUTO: 165 X10*3/UL (ref 150–450)
POTASSIUM SERPL-SCNC: 3.8 MMOL/L (ref 3.5–5.3)
PROT SERPL-MCNC: 7.2 G/DL (ref 6.4–8.2)
PROT UR STRIP.AUTO-MCNC: NORMAL MG/DL
RBC # BLD AUTO: 5.07 X10*6/UL (ref 4.5–5.9)
RBC # UR STRIP.AUTO: NEGATIVE /UL
RBC #/AREA URNS AUTO: NORMAL /HPF
SODIUM SERPL-SCNC: 136 MMOL/L (ref 136–145)
SP GR UR STRIP.AUTO: 1.02
UROBILINOGEN UR STRIP.AUTO-MCNC: NORMAL MG/DL
WBC # BLD AUTO: 9.1 X10*3/UL (ref 4.4–11.3)
WBC #/AREA URNS AUTO: NORMAL /HPF

## 2024-10-26 PROCEDURE — 82248 BILIRUBIN DIRECT: CPT | Performed by: EMERGENCY MEDICINE

## 2024-10-26 PROCEDURE — 36415 COLL VENOUS BLD VENIPUNCTURE: CPT | Performed by: EMERGENCY MEDICINE

## 2024-10-26 PROCEDURE — 99284 EMERGENCY DEPT VISIT MOD MDM: CPT | Mod: 25 | Performed by: EMERGENCY MEDICINE

## 2024-10-26 PROCEDURE — 85025 COMPLETE CBC W/AUTO DIFF WBC: CPT | Performed by: EMERGENCY MEDICINE

## 2024-10-26 PROCEDURE — 81001 URINALYSIS AUTO W/SCOPE: CPT | Performed by: EMERGENCY MEDICINE

## 2024-10-26 PROCEDURE — 2500000004 HC RX 250 GENERAL PHARMACY W/ HCPCS (ALT 636 FOR OP/ED): Performed by: EMERGENCY MEDICINE

## 2024-10-26 PROCEDURE — 96360 HYDRATION IV INFUSION INIT: CPT | Performed by: EMERGENCY MEDICINE

## 2024-10-26 PROCEDURE — 86140 C-REACTIVE PROTEIN: CPT | Performed by: EMERGENCY MEDICINE

## 2024-10-26 RX ADMIN — SODIUM CHLORIDE 500 ML: 9 INJECTION, SOLUTION INTRAVENOUS at 13:13

## 2024-10-26 ASSESSMENT — PAIN - FUNCTIONAL ASSESSMENT
PAIN_FUNCTIONAL_ASSESSMENT: 0-10
PAIN_FUNCTIONAL_ASSESSMENT: 0-10

## 2024-10-26 ASSESSMENT — PAIN SCALES - GENERAL
PAINLEVEL_OUTOF10: 0 - NO PAIN
PAINLEVEL_OUTOF10: 0 - NO PAIN

## 2024-10-26 NOTE — ED TRIAGE NOTES
Pt presents with the c/o abd bloating, passing blood in his stool, body aches and mild fever. Pt states that he had a  colonoscopy yesterday along with a flu vaccine.pt is concerned about the passing of blood.

## 2024-10-26 NOTE — ED PROVIDER NOTES
Alka Awan is a 70 year old female presenting with Medicare Wellness visit.    Patient reports itchy groin skin due to sweating.    Latex allergy?  No    Refills needed? Yes    Advance Directive for age 65:  on file    Depression Screeing (PHQ-9):  0    Medications verified, no changes.  Social History     Tobacco Use   Smoking Status Never Smoker   Smokeless Tobacco Current User   • Types: Chew   Tobacco Comment    1 tin chewing tobacco per week       Health Maintenance Due   Topic Date Due   • DTaP/Tdap/Td Vaccine (1 - Tdap) 08/08/2015   • Medicare Advantage- Medicare Wellness Visit  01/01/2022   • COVID-19 Vaccine (4 - Booster for Moderna series) 04/17/2022   • Pneumococcal Vaccine 65+ (2 - PCV) 05/20/2022       Patient is due for the topics as listed above and wishes to proceed with them. Orders placed for Immunization(s) COVID-19, Dtap/Tdap/Td and Pneumococcal.    Patient would like communication of their results via:        Cell Phone:   Telephone Information:   Mobile [phone]     Okay to leave a message containing results? Yes     HPI   Chief Complaint   Patient presents with    Black or Bloody Stool       HPI: []  24-year-old male with history of Crohn's disease on Humira had a colonoscopy done yesterday which was fairly unremarkable had some biopsies done comes in with rectal bleeding since the colonoscopy.  He had about 6 bowel movements with blood which is mixed of dark versus red.  No cramping no pain.  Has some loose stools.  Feverish also.  Had a flu shot recently also.  No cough or congestion.  No stuffy nose runny nose.  No urine frequency urgency hematuria.  No hematemesis.  No blood thinners.  No recent travel or hospitalization.    Past history: Crohn's disease  Social: Pain denies current tobacco alcohol or drug abuse.  REVIEW OF SYSTEMS:    GENERAL.: No weight loss, fatigue, anorexia, insomnia, positive for fever.    EYES: No vision loss, double vision, drainage, eye pain.    ENT: No pharyngitis, dry mouth.    CARDIOPULMONARY: No chest pain, palpitations, syncope, near syncope. No shortness of breath, cough, hemoptysis.    GI: No abdominal pain, change in bowel habits, melena, hematemesis, positive for hematochezia, nausea, vomiting, diarrhea.    : No discharge, dysuria, frequency, urgency, hematuria.    MS: No limb pain, joint pain, joint swelling.    SKIN: No rashes.    PSYCH: No depression, anxiety, suicidality, homicidality.    Review of systems is otherwise negative unless stated above or in history of present illness.  Social history, family history, allergies reviewed.  PHYSICAL EXAM:    GENERAL: Vitals noted, no distress. Alert and oriented  x 3. Non-toxic.      EENT: TMs clear. Posterior oropharynx unremarkable. No meningismus. No LAD.     NECK: Supple. Nontender. No midline tenderness.     CARDIAC: Regular, rate, rhythm. No murmurs rubs or gallops. No JVD    PULMONARY: Lungs clear bilaterally with good aeration. No wheezes rales or rhonchi. No respiratory distress.     ABDOMEN: Soft, nonsurgical. Nontender. No  peritoneal signs. Normoactive bowel sounds. No pulsatile masses.  Negative inguinal hernias negative CVA tenderness very benign nonsurgical abdomen.    EXTREMITIES: No peripheral edema. Negative Homans bilaterally, no cords.  2+ bounding pulses well-perfused.    SKIN: No rash. Intact.     NEURO: No focal neurologic deficits, NIH score of 0. Cranial nerves normal as tested from II through XII.     MEDICAL DECISION MAKING:  CBC with differential shows no leukocytosis stable hemoglobin chemistry LFTs normal CRP 5 UA negative.    Treatment: IV established, given half a liter normal saline bolus.  ED course: Patient kaylynn stable hemodynamically remains afebrile normotensive nontachycardic hypoxia.  Consult: Discussed with the on-call gastroenterology fellow who in turn discussed with Dr. Jarrett via secure chat and advanced imaging not recommended and given the hemodynamic stability, stable hemoglobin no leukocytosis it was felt patient to be discharged home.    Impression: #1 rectal bleeding  Plan/MDM: 24-year-old male history of Crohn's disease status post colonoscopy and biopsies yesterday comes in with rectal bleeding for the last 4 hours with 6 bowel movements currently stable hemodynamic.  No tachycardia or hypotension and stable hemoglobin no leukocytosis and benign abdomen.  Low concern for bowel perforation or bowel ischemia or active GI bleed or hemorrhagic shock, patient were discussed with the gastroenterology and they recommended outpatient follow-up no imaging recommended as per the recommendation by be discharged home advised supportive care close outpatient follow with primary doctor and his gastroenterologist with strict return precaution              Patient History   Past Medical History:   Diagnosis Date    Cramp and spasm 08/11/2014    Leg cramps    Crohn's disease (Multi)     Hordeolum externum unspecified eye, unspecified eyelid 04/26/2015    Stye    Juvenile osteochondrosis, unspecified      Apophysitis, juvenile    Osteochondropathy, unspecified of unspecified site 05/28/2015    Apophysitis    Other conditions influencing health status 12/06/2013    Closed Intra-articular Fracture Of The Left Calcaneus    Other specified fracture of unspecified ischium, initial encounter for closed fracture (Multi) 01/02/2015    Fracture of ischial tuberosity    Pain in leg, unspecified 01/02/2015    Leg pain    Pain in unspecified ankle and joints of unspecified foot     Ankle joint pain    Pain in unspecified elbow 05/28/2015    Elbow pain    Pain in unspecified knee     Joint pain, knee    Patellar tendinitis, unspecified knee 06/25/2014    Patellar tendinitis    Unspecified injury of lower back, initial encounter     Tailbone injury    Vitamin D deficiency, unspecified 03/24/2021    Vitamin D insufficiency     Past Surgical History:   Procedure Laterality Date    HERNIA REPAIR  2021    inguinal    OTHER SURGICAL HISTORY  10/15/2013    Excision Of Lesion Face Benign    OTHER SURGICAL HISTORY  06/09/2014    Excision Of Lesion Face     No family history on file.  Social History     Tobacco Use    Smoking status: Never    Smokeless tobacco: Never   Vaping Use    Vaping status: Never Used   Substance Use Topics    Alcohol use: Yes     Alcohol/week: 2.0 standard drinks of alcohol     Types: 2 Cans of beer per week     Comment: social    Drug use: Never       Physical Exam   ED Triage Vitals [10/26/24 1208]   Temperature Heart Rate Respirations BP   36.7 °C (98 °F) (!) 104 18 144/71      Pulse Ox Temp src Heart Rate Source Patient Position   99 % -- -- --      BP Location FiO2 (%)     -- --       Physical Exam      ED Course & [facility]   ED Course as of 10/26/24 1519   Sat Oct 26, 2024   1514 CBC is normal hemoglobin is a 15.5 CRP 5 UA negative chemistries unremarkable sugar was low specific but patient was fed patient remained stable hemodynamic.  Patient is a symptoms physical examination laboratory workup discussed with the  gastroenterology fellow who discussed with Dr. Jarrett of the on-call GI via secure chat and was felt patient does not require advanced imaging can be severe discharged home with an outpatient follow-up.  Patient okay with plan. [MT]      ED Course User Index  [MT] Ynes Pedraza MD         Diagnoses as of 10/26/24 1519   Rectal bleeding                 No data recorded     Ayanna Coma Scale Score: 15 (10/26/24 1209 : Marco Diamond RN)                           Medical Decision Making      Procedure  Procedures     Ynes Pedraza MD  10/26/24 1521

## 2024-10-26 NOTE — TELEPHONE ENCOUNTER
Called by [employer] answering service regarding patient having colonoscopy yesterday with Dr. Jarrett now having blood stools, increased abdominal pain and fever.     Called patient back and discussed symptoms. He reported he had the flu vaccine on Thursday and after his coloscopy yesterday developed a fever, body aches and chills and initially attributed this to the flu vaccine however around 2AM woke up and had blood red/maroon stools and his temperature at one point measured 100.8*F.     He also reports increased abdominal distension and some discomfort but no overt pain. He denies any nausea or vomiting.     Given the recent colonoscopy with new fever and bloody bowel movements I did recommend he get evaluated in the ED for potentially a flu test and CBC.     Patient did have multiple random colonic biopsies to monitory is IBD however would not expect a large enough degree of post biopsy bleeding to result in maroon/bloody stools.

## 2024-10-27 ENCOUNTER — TELEPHONE (OUTPATIENT)
Dept: GASTROENTEROLOGY | Facility: HOSPITAL | Age: 24
End: 2024-10-27
Payer: COMMERCIAL

## 2024-10-28 ENCOUNTER — LAB (OUTPATIENT)
Dept: LAB | Facility: LAB | Age: 24
End: 2024-10-28
Payer: COMMERCIAL

## 2024-10-28 ENCOUNTER — TELEPHONE (OUTPATIENT)
Dept: GASTROENTEROLOGY | Facility: CLINIC | Age: 24
End: 2024-10-28
Payer: COMMERCIAL

## 2024-10-28 DIAGNOSIS — K50.80 CROHN'S DISEASE OF BOTH SMALL AND LARGE INTESTINE WITHOUT COMPLICATION (MULTI): Primary | ICD-10-CM

## 2024-10-28 DIAGNOSIS — R50.82 POST-PROCEDURAL FEVER: ICD-10-CM

## 2024-10-28 DIAGNOSIS — K50.80 CROHN'S DISEASE OF BOTH SMALL AND LARGE INTESTINE WITHOUT COMPLICATION (MULTI): ICD-10-CM

## 2024-10-28 LAB
BASOPHILS # BLD AUTO: 0.05 X10*3/UL (ref 0–0.1)
BASOPHILS NFR BLD AUTO: 0.8 %
CRP SERPL-MCNC: 5.89 MG/DL
EOSINOPHIL # BLD AUTO: 0.04 X10*3/UL (ref 0–0.7)
EOSINOPHIL NFR BLD AUTO: 0.6 %
ERYTHROCYTE [DISTWIDTH] IN BLOOD BY AUTOMATED COUNT: 12.3 % (ref 11.5–14.5)
HCT VFR BLD AUTO: 45.6 % (ref 41–52)
HGB BLD-MCNC: 15 G/DL (ref 13.5–17.5)
IMM GRANULOCYTES # BLD AUTO: 0.01 X10*3/UL (ref 0–0.7)
IMM GRANULOCYTES NFR BLD AUTO: 0.2 % (ref 0–0.9)
LYMPHOCYTES # BLD AUTO: 1.77 X10*3/UL (ref 1.2–4.8)
LYMPHOCYTES NFR BLD AUTO: 27 %
MCH RBC QN AUTO: 30.2 PG (ref 26–34)
MCHC RBC AUTO-ENTMCNC: 32.9 G/DL (ref 32–36)
MCV RBC AUTO: 92 FL (ref 80–100)
MONOCYTES # BLD AUTO: 1.38 X10*3/UL (ref 0.1–1)
MONOCYTES NFR BLD AUTO: 21.1 %
NEUTROPHILS # BLD AUTO: 3.3 X10*3/UL (ref 1.2–7.7)
NEUTROPHILS NFR BLD AUTO: 50.3 %
NRBC BLD-RTO: 0 /100 WBCS (ref 0–0)
PLATELET # BLD AUTO: 188 X10*3/UL (ref 150–450)
RBC # BLD AUTO: 4.97 X10*6/UL (ref 4.5–5.9)
WBC # BLD AUTO: 6.6 X10*3/UL (ref 4.4–11.3)

## 2024-10-28 PROCEDURE — 86735 MUMPS ANTIBODY: CPT

## 2024-10-28 PROCEDURE — 86140 C-REACTIVE PROTEIN: CPT

## 2024-10-28 PROCEDURE — 36415 COLL VENOUS BLD VENIPUNCTURE: CPT

## 2024-10-28 PROCEDURE — 85025 COMPLETE CBC W/AUTO DIFF WBC: CPT

## 2024-10-28 NOTE — TELEPHONE ENCOUNTER
The patient reports ongoing abdominal cramping, blood-streaked stools, and subjective fevers, as well as measured temperatures of 101°F following a colonoscopy on 10/25/2024. He was evaluated in the ED on 10/26/2024, where his hemoglobin was normal, and he remained hemodynamically stable. The abdominal exam was benign at that time and therefore was discharged home. The patient requested that I order a CT scan, but I declined and instead recommended that he return to the ED for reassessment if symptoms continues or worsened and contact his outpatient GI provider, Sue Cárdenas. He declined the ED visit but agreed to reach out to his GI provider the following day.    Frederick Persaud MD, PhD  Gastroenterology Fellow, PGY-6  [facility]   Division of Gastroenterology and Liver Disease

## 2024-10-29 ENCOUNTER — HOSPITAL ENCOUNTER (OUTPATIENT)
Dept: RADIOLOGY | Facility: HOSPITAL | Age: 24
Discharge: HOME | End: 2024-10-29
Payer: COMMERCIAL

## 2024-10-29 ENCOUNTER — OFFICE VISIT (OUTPATIENT)
Dept: GASTROENTEROLOGY | Facility: HOSPITAL | Age: 24
End: 2024-10-29
Payer: COMMERCIAL

## 2024-10-29 VITALS
OXYGEN SATURATION: 98 % | WEIGHT: 176.5 LBS | HEART RATE: 91 BPM | DIASTOLIC BLOOD PRESSURE: 66 MMHG | BODY MASS INDEX: 25.33 KG/M2 | TEMPERATURE: 98 F | RESPIRATION RATE: 16 BRPM | SYSTOLIC BLOOD PRESSURE: 105 MMHG

## 2024-10-29 DIAGNOSIS — R10.84 GENERALIZED ABDOMINAL PAIN: ICD-10-CM

## 2024-10-29 DIAGNOSIS — R10.84 GENERALIZED ABDOMINAL PAIN: Primary | ICD-10-CM

## 2024-10-29 DIAGNOSIS — R50.82 POST-PROCEDURAL FEVER: ICD-10-CM

## 2024-10-29 DIAGNOSIS — K92.1 BLOODY STOOLS: ICD-10-CM

## 2024-10-29 PROCEDURE — 1036F TOBACCO NON-USER: CPT | Performed by: NURSE PRACTITIONER

## 2024-10-29 PROCEDURE — 99214 OFFICE O/P EST MOD 30 MIN: CPT | Performed by: NURSE PRACTITIONER

## 2024-10-29 PROCEDURE — 74177 CT ABD & PELVIS W/CONTRAST: CPT | Performed by: RADIOLOGY

## 2024-10-29 PROCEDURE — 74177 CT ABD & PELVIS W/CONTRAST: CPT

## 2024-10-29 PROCEDURE — 2550000001 HC RX 255 CONTRASTS: Performed by: NURSE PRACTITIONER

## 2024-10-29 ASSESSMENT — PAIN SCALES - GENERAL: PAINLEVEL_OUTOF10: 4

## 2024-10-29 ASSESSMENT — ENCOUNTER SYMPTOMS
ABDOMINAL PAIN: 1
HEMATOCHEZIA: 1
VOMITING: 1

## 2024-10-30 ENCOUNTER — LAB (OUTPATIENT)
Dept: LAB | Facility: LAB | Age: 24
End: 2024-10-30
Payer: COMMERCIAL

## 2024-10-30 DIAGNOSIS — K92.1 BLOODY STOOLS: ICD-10-CM

## 2024-10-30 DIAGNOSIS — K50.80 CROHN'S DISEASE OF BOTH SMALL AND LARGE INTESTINE WITHOUT COMPLICATION (MULTI): Primary | ICD-10-CM

## 2024-10-30 DIAGNOSIS — K50.80 CROHN'S DISEASE OF BOTH SMALL AND LARGE INTESTINE WITHOUT COMPLICATION (MULTI): ICD-10-CM

## 2024-10-30 DIAGNOSIS — R10.84 GENERALIZED ABDOMINAL PAIN: ICD-10-CM

## 2024-10-30 LAB
MUMPS IGG ANTIBODY INDEX: 0.6 IA
MUV IGG SER IA-ACNC: NEGATIVE

## 2024-10-30 PROCEDURE — 87329 GIARDIA AG IA: CPT

## 2024-10-30 PROCEDURE — 87493 C DIFF AMPLIFIED PROBE: CPT

## 2024-10-30 PROCEDURE — 87328 CRYPTOSPORIDIUM AG IA: CPT

## 2024-10-30 PROCEDURE — 83993 ASSAY FOR CALPROTECTIN FECAL: CPT

## 2024-10-30 PROCEDURE — 87506 IADNA-DNA/RNA PROBE TQ 6-11: CPT

## 2024-10-31 DIAGNOSIS — K50.80 CROHN'S DISEASE OF BOTH SMALL AND LARGE INTESTINE WITHOUT COMPLICATION (MULTI): Primary | ICD-10-CM

## 2024-10-31 DIAGNOSIS — K50.80 CROHN'S DISEASE OF BOTH SMALL AND LARGE INTESTINE WITHOUT COMPLICATION (MULTI): ICD-10-CM

## 2024-10-31 LAB
C COLI+JEJ+UPSA DNA STL QL NAA+PROBE: DETECTED
C DIF TOX TCDA+TCDB STL QL NAA+PROBE: NOT DETECTED
EC STX1 GENE STL QL NAA+PROBE: NOT DETECTED
EC STX2 GENE STL QL NAA+PROBE: NOT DETECTED
LABORATORY COMMENT REPORT: NORMAL
NOROVIRUS GI + GII RNA STL NAA+PROBE: NOT DETECTED
PATH REPORT.FINAL DX SPEC: NORMAL
PATH REPORT.GROSS SPEC: NORMAL
PATH REPORT.RELEVANT HX SPEC: NORMAL
PATH REPORT.TOTAL CANCER: NORMAL
RV RNA STL NAA+PROBE: NOT DETECTED
SALMONELLA DNA STL QL NAA+PROBE: NOT DETECTED
SHIGELLA DNA SPEC QL NAA+PROBE: NOT DETECTED
V CHOLERAE DNA STL QL NAA+PROBE: NOT DETECTED
Y ENTEROCOL DNA STL QL NAA+PROBE: NOT DETECTED

## 2024-10-31 RX ORDER — PREDNISONE 5 MG/1
10 TABLET ORAL DAILY
Qty: 7 TABLET | Refills: 0 | Status: SHIPPED | OUTPATIENT
Start: 2024-10-31 | End: 2024-11-01 | Stop reason: SDUPTHER

## 2024-11-01 DIAGNOSIS — K50.80 CROHN'S DISEASE OF BOTH SMALL AND LARGE INTESTINE WITHOUT COMPLICATION (MULTI): ICD-10-CM

## 2024-11-01 DIAGNOSIS — A04.5 CAMPYLOBACTER ENTERITIS: Primary | ICD-10-CM

## 2024-11-01 RX ORDER — CIPROFLOXACIN 500 MG/1
500 TABLET ORAL 2 TIMES DAILY
Qty: 20 TABLET | Refills: 0 | Status: SHIPPED | OUTPATIENT
Start: 2024-11-01 | End: 2024-11-11

## 2024-11-01 RX ORDER — PREDNISONE 5 MG/1
10 TABLET ORAL DAILY
Qty: 7 TABLET | Refills: 0 | OUTPATIENT
Start: 2024-11-01

## 2024-11-01 RX ORDER — PREDNISONE 5 MG/1
10 TABLET ORAL DAILY
Qty: 14 TABLET | Refills: 0 | Status: SHIPPED | OUTPATIENT
Start: 2024-11-01 | End: 2024-11-08

## 2024-11-02 LAB
CRYPTOSP AG STL QL IA: NEGATIVE
G LAMBLIA AG STL QL IA: NEGATIVE

## 2024-11-03 LAB — CALPROTECTIN STL-MCNT: 403 UG/G

## 2024-11-07 LAB — O+P STL MICRO: NEGATIVE

## 2025-01-08 DIAGNOSIS — K50.80 CROHN'S DISEASE OF BOTH SMALL AND LARGE INTESTINE WITHOUT COMPLICATION (MULTI): ICD-10-CM

## 2025-01-08 RX ORDER — ADALIMUMAB-ADAZ 40 MG/.4ML
INJECTION, SOLUTION SUBCUTANEOUS
Qty: 2.4 ML | Refills: 3 | Status: SHIPPED | OUTPATIENT
Start: 2025-01-08

## 2025-02-06 DIAGNOSIS — K50.80 CROHN'S DISEASE OF BOTH SMALL AND LARGE INTESTINE WITHOUT COMPLICATION (MULTI): ICD-10-CM

## 2025-02-06 RX ORDER — ADALIMUMAB-ADAZ 40 MG/.4ML
INJECTION, SOLUTION SUBCUTANEOUS
Qty: 2.4 ML | Refills: 3 | Status: SHIPPED | OUTPATIENT
Start: 2025-02-06

## 2025-02-25 DIAGNOSIS — K50.80 CROHN'S DISEASE OF BOTH SMALL AND LARGE INTESTINE WITHOUT COMPLICATION (MULTI): Primary | ICD-10-CM

## 2025-02-25 RX ORDER — ADALIMUMAB-BWWD 40 MG/.4ML
40 SOLUTION SUBCUTANEOUS
Qty: 0.8 ML | Refills: 11 | Status: SHIPPED | OUTPATIENT
Start: 2025-02-25

## 2025-03-13 DIAGNOSIS — K50.80 CROHN'S DISEASE OF BOTH SMALL AND LARGE INTESTINE WITHOUT COMPLICATION (MULTI): ICD-10-CM

## 2025-03-14 ENCOUNTER — SPECIALTY PHARMACY (OUTPATIENT)
Dept: PHARMACY | Facility: CLINIC | Age: 25
End: 2025-03-14

## 2025-03-19 RX ORDER — ADALIMUMAB-BWWD 40 MG/.4ML
40 SOLUTION SUBCUTANEOUS
Qty: 0.8 ML | Refills: 11 | Status: SHIPPED | OUTPATIENT
Start: 2025-03-19

## 2025-03-20 ENCOUNTER — TELEPHONE (OUTPATIENT)
Dept: GASTROENTEROLOGY | Facility: HOSPITAL | Age: 25
End: 2025-03-20
Payer: COMMERCIAL

## 2025-03-20 NOTE — TELEPHONE ENCOUNTER
Jacob Rogers,    I spoke with the patient and he would like for you to give him a call regarding his medication (Hadlima,CF, PushTouch) 40 mg/0.4 mL auto-injector. He stated the pharmacy needed to speak with you. He is worried he will not be able to get his meds in time and wanted to know if there are samples for his medication until he can receive his prescription. [phone]      Thanks,  Ramona

## 2025-03-31 ENCOUNTER — TELEPHONE (OUTPATIENT)
Dept: GASTROENTEROLOGY | Facility: CLINIC | Age: 25
End: 2025-03-31
Payer: COMMERCIAL

## 2025-05-30 ENCOUNTER — SPECIALTY PHARMACY (OUTPATIENT)
Dept: PHARMACY | Facility: CLINIC | Age: 25
End: 2025-05-30

## 2025-06-02 ENCOUNTER — TELEPHONE (OUTPATIENT)
Dept: GASTROENTEROLOGY | Facility: HOSPITAL | Age: 25
End: 2025-06-02
Payer: COMMERCIAL

## 2025-06-06 ENCOUNTER — TELEPHONE (OUTPATIENT)
Dept: GASTROENTEROLOGY | Facility: HOSPITAL | Age: 25
End: 2025-06-06
Payer: COMMERCIAL

## 2025-06-06 NOTE — TELEPHONE ENCOUNTER
Patient called regarding adalimumab. Patient scheduled for follow-up on 6/10, previously seen by Sue Cárdenas. Patient states he is due for next injection on 6/11 and is concerned about missing dose.

## 2025-06-10 ENCOUNTER — DOCUMENTATION (OUTPATIENT)
Dept: GASTROENTEROLOGY | Facility: HOSPITAL | Age: 25
End: 2025-06-10
Payer: COMMERCIAL

## 2025-06-30 ENCOUNTER — TELEPHONE (OUTPATIENT)
Dept: GASTROENTEROLOGY | Facility: HOSPITAL | Age: 25
End: 2025-06-30
Payer: COMMERCIAL

## 2025-06-30 NOTE — TELEPHONE ENCOUNTER
Contacted patient regarding scheduling a follow-up appointment. Patient stated he would call back to schedule.

## 2025-07-11 ENCOUNTER — TELEPHONE (OUTPATIENT)
Dept: GASTROENTEROLOGY | Facility: HOSPITAL | Age: 25
End: 2025-07-11
Payer: COMMERCIAL